# Patient Record
Sex: MALE | Race: BLACK OR AFRICAN AMERICAN | NOT HISPANIC OR LATINO | Employment: UNEMPLOYED | URBAN - METROPOLITAN AREA
[De-identification: names, ages, dates, MRNs, and addresses within clinical notes are randomized per-mention and may not be internally consistent; named-entity substitution may affect disease eponyms.]

---

## 2017-05-25 ENCOUNTER — HOSPITAL ENCOUNTER (EMERGENCY)
Facility: CLINIC | Age: 22
Discharge: HOME | End: 2017-05-26
Attending: EMERGENCY MEDICINE | Admitting: EMERGENCY MEDICINE
Payer: COMMERCIAL

## 2017-05-25 VITALS
OXYGEN SATURATION: 99 % | SYSTOLIC BLOOD PRESSURE: 126 MMHG | HEIGHT: 74 IN | HEART RATE: 80 BPM | RESPIRATION RATE: 18 BRPM | BODY MASS INDEX: 21.17 KG/M2 | TEMPERATURE: 98.3 F | WEIGHT: 165 LBS | DIASTOLIC BLOOD PRESSURE: 84 MMHG

## 2017-05-25 DIAGNOSIS — K59.00 CONSTIPATION, UNSPECIFIED CONSTIPATION TYPE: ICD-10-CM

## 2017-05-25 PROCEDURE — 85027 COMPLETE CBC AUTOMATED: CPT | Performed by: EMERGENCY MEDICINE

## 2017-05-25 PROCEDURE — 80053 COMPREHEN METABOLIC PANEL: CPT | Performed by: EMERGENCY MEDICINE

## 2017-05-25 PROCEDURE — 83690 ASSAY OF LIPASE: CPT | Performed by: EMERGENCY MEDICINE

## 2017-05-25 PROCEDURE — 99285 EMERGENCY DEPT VISIT HI MDM: CPT

## 2017-05-25 NOTE — ED AVS SNAPSHOT
Emergency Department    [address] [facility] [address]    Phone:  [phone]    Fax:  [phone]                                       Kathie Mg   MRN: [id number]    Department:   Emergency Department   Date of Visit:  5/25/2017           Patient Information     Date Of Birth          1995        Your diagnoses for this visit were:     Constipation, unspecified constipation type        You were seen by Claudette Sanders MD.      Follow-up Information     Follow up with Other [facility].    Specialty:  Clinic    Contact information:               Follow up with  Emergency Department.    Specialty:  EMERGENCY MEDICINE    Why:  If symptoms worsen    Contact information:    [address] [facility] [address] [phone]        Discharge Instructions       Recommend starting miralax twice per day, senna and colace twice per day  Can also use magnesium citrate or enemas as well      Discharge Instructions  Constipation  Your doctor has diagnosed you with constipation. Constipation can cause severe cramping pain and your physician thinks this is the cause of your abdominal pain today.  People usually recognize that they are constipated because they have difficulty having bowel movements, are not having bowel movements frequently enough, or are not having large enough bowel movements. Sometimes, especially in children or older people, you do not recognize that you are constipated until it becomes severe. The most common cause of constipation is a combination of lack of exercise and not eating enough fruits, vegetables and whole grains. Constipation can also be a side effect of medications, such as narcotics, or may be caused by a disease of the digestive system.    Return to the Emergency Department if:    Your abdominal pain worsens or does not improve after a bowel movement.    You become very weak.    You get an oral temperature above 102oF or as  directed by your doctor.    You have blood in your stools (bright red or black, tarry stools).    You keep throwing up or can t drink liquids.    Your see blood when you throw up.    Your stomach gets bloated or bigger.    You have new symptoms or anything that worries you.    What can I do to help myself?    If your doctor gave you a cathartic medication, like magnesium citrate or GoLytely  (polyethylene glycol), you can expect to have cramps and gas pains after taking it. You can expect to have a number of bowel movements and even diarrhea in the course of clearing your bowels.  You will know your bowels have been cleaned out after you pass clear liquid. The cramps and gas should let up after you have emptied your bowels. You may want to wait until morning to take this type of medication so you aren t up in the night.     Sometimes instead of cathartics, we recommend laxatives like milk of magnesia to move your bowels more slowly, or an enema to help the bowels to move. Read and follow the package directions, or follow your physician s instructions.    Once you have become very constipated, it takes time for your bowels to return to normal and you need to be very careful to prevent becoming constipated again. Take a laxative if you don t move your bowels at least every two days.       Eat foods that have a lot of fiber. Good choices are fruits, vegetables, prune juice, apple juice and high fiber cereal. Limit dairy products such as milk and cheese, since these can make constipation worse.     Drink plenty of water and other fluids.     When you feel the need to go to the bathroom, go to the bathroom. Don t hold it.    Miralax , Metamucil , Colace , Senna or fiber supplements can be used daily.  Miralax  daily is often the best choice for children.    FOLLOW UP WITH YOUR REGULAR DOCTOR IF YOUR CONSTIPATION IS NOT IMPROVING.  Sometimes, chronic constipation requires further testing to determine the cause. If you are  over 50 years old, you may need a colonoscopy if you have not had one before.   If you were given a prescription for medicine here today, be sure to read all of the information (including the package insert) that comes with your prescription.  This will include important information about the medicine, its side effects, and any warnings that you need to know about.  The pharmacist who fills the prescription can provide more information and answer questions you may have about the medicine.  If you have questions or concerns that the pharmacist cannot address, please call or return to the Emergency Department.     Remember that you can always come back to the Emergency Department if you are not able to see your regular doctor in the amount of time listed above, if you get any new symptoms, or if there is anything that worries you.        24 Hour Appointment Hotline       To make an appointment at any [facility], call [phone] ([phone]). If you don't have a family doctor or clinic, we will help you find one. [facility] clinics are conveniently located to serve the needs of you and your family.             Review of your medicines      Notice     You have not been prescribed any medications.            Procedures and tests performed during your visit     CBC (+ platelets, no diff)    CT Abdomen Pelvis w Contrast    Comprehensive metabolic panel    Lipase      Orders Needing Specimen Collection     None      Pending Results     No orders found for last 3 day(s).            Pending Culture Results     No orders found for last 3 day(s).            Pending Results Instructions     If you had any lab results that were not finalized at the time of your Discharge, you can call the ED Lab Result RN at [phone]. You will be contacted by this team for any positive Lab results or changes in treatment. The nurses are available 7 days a week from 10A to 6:30P.  You can leave a message 24 hours per day and they  will return your call.        Test Results From Your Hospital Stay        5/26/2017 12:27 AM      Component Results     Component Value Ref Range & Units Status    Sodium 139 133 - 144 mmol/L Final    Potassium 3.7 3.4 - 5.3 mmol/L Final    Chloride 105 94 - 109 mmol/L Final    Carbon Dioxide 28 20 - 32 mmol/L Final    Anion Gap 6 3 - 14 mmol/L Final    Glucose 88 70 - 99 mg/dL Final    Urea Nitrogen 8 7 - 30 mg/dL Final    Creatinine 1.11 0.66 - 1.25 mg/dL Final    GFR Estimate 82 >60 mL/min/1.7m2 Final    Non  GFR Calc    GFR Estimate If Black >90   GFR Calc   >60 mL/min/1.7m2 Final    Calcium 9.2 8.5 - 10.1 mg/dL Final    Bilirubin Total 0.6 0.2 - 1.3 mg/dL Final    Albumin 4.1 3.4 - 5.0 g/dL Final    Protein Total 7.7 6.8 - 8.8 g/dL Final    Alkaline Phosphatase 94 40 - 150 U/L Final    ALT 19 0 - 70 U/L Final    AST 16 0 - 45 U/L Final         5/26/2017 12:08 AM      Component Results     Component Value Ref Range & Units Status    WBC 8.5 4.0 - 11.0 10e9/L Final    RBC Count 5.92 (H) 4.4 - 5.9 10e12/L Final    Hemoglobin 14.4 13.3 - 17.7 g/dL Final    Hematocrit 44.8 40.0 - 53.0 % Final    MCV 76 (L) 78 - 100 fl Final    MCH 24.3 (L) 26.5 - 33.0 pg Final    MCHC 32.1 31.5 - 36.5 g/dL Final    RDW 13.4 10.0 - 15.0 % Final    Platelet Count 172 150 - 450 10e9/L Final         5/26/2017 12:25 AM      Component Results     Component Value Ref Range & Units Status    Lipase 73 73 - 393 U/L Final         5/26/2017  3:40 AM      Narrative     CT ABDOMEN AND PELVIS WITH CONTRAST  5/26/2017 1:42 AM     HISTORY: Abdominal pain. Rectal pain. Abnormal bowel movements.    COMPARISON: None.    TECHNIQUE: Following the uneventful administration of 81 mL Isovue-370  intravenous contrast, helical sections were acquired from the top of  the diaphragm through the pubic symphysis. Coronal reconstructions  were generated. Radiation dose for this scan was reduced using  automated exposure control,  adjustment of the mA and/or kV according  to the patient's size, or iterative reconstruction technique.    FINDINGS:  Abdomen: The liver, spleen, pancreas, adrenal glands and kidneys are  unremarkable. The gallbladder is present. No enlarged lymph nodes or  free fluid in the upper abdomen.    Scan through the lower chest is unremarkable.    Pelvis: A moderate amount of stool in the left hemicolon and  rectosigmoid colon. The rectum is moderately distended with stool. The  small and large bowel are otherwise normal in caliber. The appendix is  not definitely visualized. No bowel wall thickening, pneumatosis or  free intraperitoneal gas. Fluid is present within the lumens of the  small and large bowel. No perirectal fluid collection. No enlarged  lymph nodes or free fluid in the pelvis.        Impression     IMPRESSION:  1. A moderate amount of stool is present in the left hemicolon and  rectosigmoid colon and the rectum is moderately distended with stool.  2. Fluid is present in the lumens of the small and large bowel. This  is nonspecific, but could relate to gastroenteritis.  3. No other cause of acute pain identified in the abdomen or pelvis.    DAMION REBOLLEDO MD                Clinical Quality Measure: Blood Pressure Screening     Your blood pressure was checked while you were in the emergency department today. The last reading we obtained was  BP: 126/84 . Please read the guidelines below about what these numbers mean and what you should do about them.  If your systolic blood pressure (the top number) is less than 120 and your diastolic blood pressure (the bottom number) is less than 80, then your blood pressure is normal. There is nothing more that you need to do about it.  If your systolic blood pressure (the top number) is 120-139 or your diastolic blood pressure (the bottom number) is 80-89, your blood pressure may be higher than it should be. You should have your blood pressure rechecked within a year by a  "primary care provider.  If your systolic blood pressure (the top number) is 140 or greater or your diastolic blood pressure (the bottom number) is 90 or greater, you may have high blood pressure. High blood pressure is treatable, but if left untreated over time it can put you at risk for heart attack, stroke, or kidney failure. You should have your blood pressure rechecked by a primary care provider within the next 4 weeks.  If your provider in the emergency department today gave you specific instructions to follow-up with your doctor or provider even sooner than that, you should follow that instruction and not wait for up to 4 weeks for your follow-up visit.        Thank you for choosing [facility]       Thank you for choosing [facility] for your care. Our goal is always to provide you with excellent care. Hearing back from our patients is one way we can continue to improve our services. Please take a few minutes to complete the written survey that you may receive in the mail after you visit with us. Thank you!        [employer] Information     [facility] lets you send messages to your doctor, view your test results, renew your prescriptions, schedule appointments and more. To sign up, go to [email or web address] . Click on \"Log in\" on the left side of the screen, which will take you to the Welcome page. Then click on \"Sign up Now\" on the right side of the page.     You will be asked to enter the access code listed below, as well as some personal information. Please follow the directions to create your username and password.     Your access code is: [email or web address]  Expires: 2017  4:13 AM     Your access code will  in 90 days. If you need help or a new code, please call your [facility] clinic or [phone].        Care EveryWhere ID     This is your Care EveryWhere ID. This could be used by other organizations to access your [facility] medical records  [id number]        After Visit Summary       This is your " record. Keep this with you and show to your community pharmacist(s) and doctor(s) at your next visit.

## 2017-05-25 NOTE — ED AVS SNAPSHOT
Emergency Department    [address] [place] [address]    Phone:  [phone]    Fax:  [phone]                                       Kathie Mg   MRN: [id number]    Department:   Emergency Department   Date of Visit:  5/25/2017           After Visit Summary Signature Page     I have received my discharge instructions, and my questions have been answered. I have discussed any challenges I see with this plan with the nurse or doctor.    ..........................................................................................................................................  Patient/Patient Representative Signature      ..........................................................................................................................................  Patient Representative Print Name and Relationship to Patient    ..................................................               ................................................  Date                                            Time    ..........................................................................................................................................  Reviewed by Signature/Title    ...................................................              ..............................................  Date                                                            Time

## 2017-05-26 ENCOUNTER — APPOINTMENT (OUTPATIENT)
Dept: CT IMAGING | Facility: CLINIC | Age: 22
End: 2017-05-26
Attending: EMERGENCY MEDICINE
Payer: COMMERCIAL

## 2017-05-26 LAB
ALBUMIN SERPL-MCNC: 4.1 G/DL (ref 3.4–5)
ALP SERPL-CCNC: 94 U/L (ref 40–150)
ALT SERPL W P-5'-P-CCNC: 19 U/L (ref 0–70)
ANION GAP SERPL CALCULATED.3IONS-SCNC: 6 MMOL/L (ref 3–14)
AST SERPL W P-5'-P-CCNC: 16 U/L (ref 0–45)
BILIRUB SERPL-MCNC: 0.6 MG/DL (ref 0.2–1.3)
BUN SERPL-MCNC: 8 MG/DL (ref 7–30)
CALCIUM SERPL-MCNC: 9.2 MG/DL (ref 8.5–10.1)
CHLORIDE SERPL-SCNC: 105 MMOL/L (ref 94–109)
CO2 SERPL-SCNC: 28 MMOL/L (ref 20–32)
CREAT SERPL-MCNC: 1.11 MG/DL (ref 0.66–1.25)
ERYTHROCYTE [DISTWIDTH] IN BLOOD BY AUTOMATED COUNT: 13.4 % (ref 10–15)
GFR SERPL CREATININE-BSD FRML MDRD: 82 ML/MIN/1.7M2
GLUCOSE SERPL-MCNC: 88 MG/DL (ref 70–99)
HCT VFR BLD AUTO: 44.8 % (ref 40–53)
HGB BLD-MCNC: 14.4 G/DL (ref 13.3–17.7)
LIPASE SERPL-CCNC: 73 U/L (ref 73–393)
MCH RBC QN AUTO: 24.3 PG (ref 26.5–33)
MCHC RBC AUTO-ENTMCNC: 32.1 G/DL (ref 31.5–36.5)
MCV RBC AUTO: 76 FL (ref 78–100)
PLATELET # BLD AUTO: 172 10E9/L (ref 150–450)
POTASSIUM SERPL-SCNC: 3.7 MMOL/L (ref 3.4–5.3)
PROT SERPL-MCNC: 7.7 G/DL (ref 6.8–8.8)
RBC # BLD AUTO: 5.92 10E12/L (ref 4.4–5.9)
SODIUM SERPL-SCNC: 139 MMOL/L (ref 133–144)
WBC # BLD AUTO: 8.5 10E9/L (ref 4–11)

## 2017-05-26 PROCEDURE — 74177 CT ABD & PELVIS W/CONTRAST: CPT

## 2017-05-26 PROCEDURE — 25000132 ZZH RX MED GY IP 250 OP 250 PS 637: Performed by: EMERGENCY MEDICINE

## 2017-05-26 PROCEDURE — 25000128 H RX IP 250 OP 636: Performed by: EMERGENCY MEDICINE

## 2017-05-26 PROCEDURE — 25000125 ZZHC RX 250: Performed by: EMERGENCY MEDICINE

## 2017-05-26 RX ORDER — IOPAMIDOL 755 MG/ML
81 INJECTION, SOLUTION INTRAVASCULAR ONCE
Status: COMPLETED | OUTPATIENT
Start: 2017-05-26 | End: 2017-05-26

## 2017-05-26 RX ADMIN — IOPAMIDOL 81 ML: 755 INJECTION, SOLUTION INTRAVENOUS at 01:43

## 2017-05-26 RX ADMIN — SODIUM CHLORIDE 65 ML: 9 INJECTION, SOLUTION INTRAVENOUS at 01:44

## 2017-05-26 RX ADMIN — DOCUSATE SODIUM 286 ML: 10 LIQUID ORAL at 02:55

## 2017-05-26 ASSESSMENT — ENCOUNTER SYMPTOMS
ANAL BLEEDING: 1
VOMITING: 0
RECTAL PAIN: 1
DYSURIA: 0
DIFFICULTY URINATING: 1
CONSTIPATION: 1
ABDOMINAL PAIN: 1
FEVER: 0
HEMATURIA: 0
NAUSEA: 0

## 2017-05-26 NOTE — DISCHARGE INSTRUCTIONS
Recommend starting miralax twice per day, senna and colace twice per day  Can also use magnesium citrate or enemas as well      Discharge Instructions  Constipation  Your doctor has diagnosed you with constipation. Constipation can cause severe cramping pain and your physician thinks this is the cause of your abdominal pain today.  People usually recognize that they are constipated because they have difficulty having bowel movements, are not having bowel movements frequently enough, or are not having large enough bowel movements. Sometimes, especially in children or older people, you do not recognize that you are constipated until it becomes severe. The most common cause of constipation is a combination of lack of exercise and not eating enough fruits, vegetables and whole grains. Constipation can also be a side effect of medications, such as narcotics, or may be caused by a disease of the digestive system.    Return to the Emergency Department if:    Your abdominal pain worsens or does not improve after a bowel movement.    You become very weak.    You get an oral temperature above 102oF or as directed by your doctor.    You have blood in your stools (bright red or black, tarry stools).    You keep throwing up or can t drink liquids.    Your see blood when you throw up.    Your stomach gets bloated or bigger.    You have new symptoms or anything that worries you.    What can I do to help myself?    If your doctor gave you a cathartic medication, like magnesium citrate or GoLytely  (polyethylene glycol), you can expect to have cramps and gas pains after taking it. You can expect to have a number of bowel movements and even diarrhea in the course of clearing your bowels.  You will know your bowels have been cleaned out after you pass clear liquid. The cramps and gas should let up after you have emptied your bowels. You may want to wait until morning to take this type of medication so you aren t up in the night.      Sometimes instead of cathartics, we recommend laxatives like milk of magnesia to move your bowels more slowly, or an enema to help the bowels to move. Read and follow the package directions, or follow your physician s instructions.    Once you have become very constipated, it takes time for your bowels to return to normal and you need to be very careful to prevent becoming constipated again. Take a laxative if you don t move your bowels at least every two days.       Eat foods that have a lot of fiber. Good choices are fruits, vegetables, prune juice, apple juice and high fiber cereal. Limit dairy products such as milk and cheese, since these can make constipation worse.     Drink plenty of water and other fluids.     When you feel the need to go to the bathroom, go to the bathroom. Don t hold it.    Miralax , Metamucil , Colace , Senna or fiber supplements can be used daily.  Miralax  daily is often the best choice for children.    FOLLOW UP WITH YOUR REGULAR DOCTOR IF YOUR CONSTIPATION IS NOT IMPROVING.  Sometimes, chronic constipation requires further testing to determine the cause. If you are over 50 years old, you may need a colonoscopy if you have not had one before.   If you were given a prescription for medicine here today, be sure to read all of the information (including the package insert) that comes with your prescription.  This will include important information about the medicine, its side effects, and any warnings that you need to know about.  The pharmacist who fills the prescription can provide more information and answer questions you may have about the medicine.  If you have questions or concerns that the pharmacist cannot address, please call or return to the Emergency Department.     Remember that you can always come back to the Emergency Department if you are not able to see your regular doctor in the amount of time listed above, if you get any new symptoms, or if there is anything that  worries you.

## 2017-05-26 NOTE — ED NOTES
Bed: ED01  Expected date: 5/25/17  Expected time: 11:17 PM  Means of arrival: Ambulance  Comments:  531 22m abd pain

## 2017-05-26 NOTE — ED PROVIDER NOTES
"  History     Chief Complaint:  Abdominal pain, Constipation, Rectal pain     HPI   Kathie Mg is a 22 year old male who presents via EMS for evaluation of abdominal pain, constipation, and rectal pain which began a few days ago.  The patient reports his last good bowel movement was about seven days ago.  He states a few days ago he began to experience diffuse abdominal pain which he describes as a sharp stabbing \"gas-like\" pain.  He reports he then developed rectal pain and pressure stating he has had the urge to make a bowel movement but has been unable to.  The patient states he had a small bowel movement yesterday but relays it was hard and afterwards he continued to have pain and the urge to make a movement.  Additionally, he  reports there were drops of red blood on the toilet and on the toilet paper.  He reports urinating increases the rectal pain and he feels he is unable to completely void because of pain in bottom.  He denies any pain or burning with urination or hematuria.  The patient states he has been trying magnesium water and mineral oils without improvement of his symptoms. The patient denies similar symptoms previously, denies personal or family history of Crohn's or ulcerative colitis, and denies prior abdominal surgeries.  He denies fever, chills, nausea, and vomiting.      Allergies:  NKDA     Medications:    The patient is currently on no regular medications.       Past Medical History:    ADHD  Anxiety  Depression  Insomnia     Past Surgical History:    History reviewed.  No significant past surgical history.     Family History:  History reviewed.  No significant family history.     Social History:  Relationship status: Single  The patient is a current smoker. 0.25 pack/day  The patient reports alcohol use.   The patient presents alone via EMS.    Review of Systems   Constitutional: Negative for fever.   Gastrointestinal: Positive for abdominal pain, anal bleeding, constipation and " "rectal pain. Negative for nausea and vomiting.   Genitourinary: Positive for difficulty urinating. Negative for dysuria and hematuria.   All other systems reviewed and are negative.      Physical Exam   First Vitals:  BP: 126/84  Pulse: 80  Temp: 98.3  F (36.8  C)  Resp: 18  Height: 188 cm (6' 2\")  Weight: 74.8 kg (165 lb)  SpO2: 99 %      Physical Exam  General: Resting comfortably on the gurney  Eyes:  The pupils are equal and round  ENT:    Moist mucous membranes  Neck:  Normal range of motion  CV:  Regular rate and rhythm    Skin warm and well perfused   Resp:  Lungs are clear    Non-labored    No rales    No wheezing   GI:  Abdomen is soft, there is no rigidity.  Mild diffuse abdominal tenderness.      No distension    No rebound tenderness    No guarding  REctal: Non bloody, mild pain on digital exam, no hemorrhoids or mass palpated. Male RN in room  MS:  Normal muscular tone  Skin:  No rash or acute skin lesions noted  Neuro:   Awake, alert.      Speech is normal and fluent.    Face is symmetric.     Moves all extremities  Psych:  Normal affect.  Appropriate interactions.      Emergency Department Course     Imaging:  Radiographic findings were communicated with the patient who voiced understanding of the findings.    CT Abdomen and Pelvis, with contrast, as per radiology:   IMPRESSION:  1. A moderate amount of stool is present in the left hemicolon and  rectosigmoid colon and the rectum is moderately distended with stool.  2. Fluid is present in the lumens of the small and large bowel. This  is nonspecific, but could relate to gastroenteritis.  3. No other cause of acute pain identified in the abdomen or pelvis.    Laboratory:  CBC: WBC 8.5 (WNL) HGB 14.4 (WNL)  (WNL)   CMP: Cr 1.11 (WNL) Glucose 88 (WNL) Rest WNL  Lipase: 73 (WNL)    Interventions:  0255 Pink Lady Enema, 286 mL, rectal     ED Course:  The patient arrived by ambulance. He was escorted to the ED where his vitals were measured and " recorded.   Nursing notes and past medical history reviewed.   I performed a physical examination of the patient as documented above.  I explained the plan with the patient who consents to this.   The patient underwent the workup as described above.   I performed a rectal examination in the presence of a male chaperone.   Patient reports good results and relief of pain following enema.   I personally reviewed the laboratory and imaging results with the Patient and answered all related questions prior to discharge.   Findings and plan explained to the Patient. Patient discharged home with instructions regarding supportive care, medications, and reasons to return. The importance of close follow-up was reviewed.     Impression & Plan      Medical Decision Making:  Kathie Mg is a 22 year old male who presents to the emergency department today with constipation.  He is also complaining of rectal pain.  He is also having a little bit of bleeding when he wipes.  His vital signs and hemoglobin are normal.  He is having mild diffuse abdominal tenderness.  I obtained CT scan which shows a moderate amount of stool and there is also some fluid in the small and large bowel which could be nonspecific but could also be related to gastroenteritis.  I feel that gastroenteritis is unlikely.  Given the constipation he wanted to try an enema and so this was done. Had good bowel movement in ED and felt much better.  I recommended a stool regimen at home and follow up if not improving. Hemoglobin stable, vital signs stable, no indication for hospitalization and colonoscopy emergently. Blood will likely go away now that bowel movements are back to normal. Could be from small fissure based on his history of constipation.  Reasons to return to the ED were discussed with the patient.      Diagnosis:    ICD-10-CM   1. Constipation, unspecified constipation type K59.00         Disposition:   Discharge to home with primary care follow  up.       I, Raymond Frey, am serving as a scribe on 5/25/2017 at 11:45 PM to personally document services performed by Claudette Sanders MD, based on my observations and the provider's statements to me.       Claudette Sanders MD  05/26/17 1119

## 2023-10-19 ENCOUNTER — HOSPITAL ENCOUNTER (EMERGENCY)
Facility: CLINIC | Age: 28
Discharge: HOME | End: 2023-10-19
Attending: EMERGENCY MEDICINE | Admitting: EMERGENCY MEDICINE
Payer: COMMERCIAL

## 2023-10-19 VITALS
SYSTOLIC BLOOD PRESSURE: 143 MMHG | TEMPERATURE: 98.9 F | HEART RATE: 102 BPM | DIASTOLIC BLOOD PRESSURE: 90 MMHG | OXYGEN SATURATION: 100 % | RESPIRATION RATE: 16 BRPM

## 2023-10-19 DIAGNOSIS — K08.89 PAIN, DENTAL: ICD-10-CM

## 2023-10-19 PROCEDURE — 99283 EMERGENCY DEPT VISIT LOW MDM: CPT

## 2023-10-19 PROCEDURE — 250N000013 HC RX MED GY IP 250 OP 250 PS 637: Performed by: EMERGENCY MEDICINE

## 2023-10-19 RX ORDER — OXYCODONE HYDROCHLORIDE 5 MG/1
10 TABLET ORAL ONCE
Status: COMPLETED | OUTPATIENT
Start: 2023-10-19 | End: 2023-10-19

## 2023-10-19 RX ORDER — ACETAMINOPHEN 500 MG
1000 TABLET ORAL ONCE
Status: COMPLETED | OUTPATIENT
Start: 2023-10-19 | End: 2023-10-19

## 2023-10-19 RX ADMIN — OXYCODONE HYDROCHLORIDE 10 MG: 5 TABLET ORAL at 17:02

## 2023-10-19 RX ADMIN — ACETAMINOPHEN 1000 MG: 500 TABLET, FILM COATED ORAL at 17:02

## 2023-10-19 ASSESSMENT — ACTIVITIES OF DAILY LIVING (ADL): ADLS_ACUITY_SCORE: 35

## 2023-10-19 NOTE — ED PROVIDER NOTES
History     Chief Complaint:  Dental Pain       HPI   Kathie Mg is a 28 year old male presenting with right lower tooth pain.  He had a chipped tooth that started at 1 year ago.  The pain significantly worsened recently.  He has not been able to get into see a dentist as of yet.  He has been taking Advil for pain, with no relief.  No fever, chills.      Independent Historian:    Patient only    Review of External Notes:  NA      Medications:    No current outpatient medications on file.      Past Medical History:    Past Medical History:   Diagnosis Date    ADHD (attention deficit hyperactivity disorder)     Anxiety     Depressive disorder     Insomnia        Past Surgical History:    No past surgical history on file.       Physical Exam   Patient Vitals for the past 24 hrs:   BP Temp Temp src Pulse Resp SpO2   10/19/23 1654 -- -- -- -- 16 --   10/19/23 1652 (!) 143/90 98.9  F (37.2  C) Temporal 102 -- 100 %        Physical Exam  General: No acute distress.  Eyes:  Conjunctivae clear.   HENT: Dental andrae of tooth 31, tooth is not loose and is nontender to palpation.  No surrounding edema or signs of abscess.  Tolerating secretions.  No anterior neck or facial swelling.  Neck: Normal range of motion.   CV: Skin is well perfused, no cyanosis  Respiratory: Effort normal. No audible wheezing.  Gastrointestinal: Non-distended.  Musculoskeletal: Normal range of motion. No gross deformities.  Neuro: Alert. Moving all extremities appropriately.  Normal speech.    Skin: No rashes or lesions on exposed skin.      Emergency Department Course     Laboratory:  Labs Ordered and Resulted from Time of ED Arrival to Time of ED Departure - No data to display     Procedures   NA    Emergency Department Course & Assessments:             Interventions:  Medications   acetaminophen (TYLENOL) tablet 1,000 mg (1,000 mg Oral $Given 10/19/23 1702)   oxyCODONE (ROXICODONE) tablet 10 mg (10 mg Oral $Given 10/19/23 1702)         Assessments:  1658 -initial assessment and evaluation of patient    Independent Interpretation (X-rays, CTs, rhythm strip):  None    Consultations/Discussion of Management or Tests:  None       Social Determinants of Health affecting care:  None     Disposition:  The patient was discharged to home.     Impression & Plan    CMS Diagnoses: None      Medical Decision Making:  Patient appears uncomfortable, but is hemodynamically stable.  He is tolerating his secretions and protecting his airway.  On exam he has a dental andrae with no concern for abscess.  I do not believe he needs any imaging.  He would benefit from a dentist evaluation.  It is made clear to him that we do not pull teeth or fill cavities in the emergency department.  His significant other is at bedside, and she is calling the dentist.  I offered him a dental block or a pill for pain.  He elected to take a pill.  He is given Tylenol and oxycodone.  He is medically cleared for discharge.  They plan to go to the dentist after leaving the emergency department.  Return precautions are given and he verbalizes understanding.  He is discharged in stable condition with his significant other.        Diagnosis:    ICD-10-CM    1. Pain, dental  K08.89            Discharge Medications:  There are no discharge medications for this patient.         Cyrus Vaca MD  10/19/2023   Cyrus Vaca MD Peery, Stephen, MD  10/19/23 7431

## 2023-10-19 NOTE — ED TRIAGE NOTES
Right lower molar pain that's worsening for several days.        
This patient has been assessed with a concern for Malnutrition and was treated during this hospitalization for the following Nutrition diagnosis/diagnoses:     -  01/02/2023: Moderate protein-calorie malnutrition

## 2025-03-23 ENCOUNTER — HOSPITAL ENCOUNTER (OUTPATIENT)
Facility: CLINIC | Age: 30
Setting detail: OBSERVATION
Discharge: HOME | End: 2025-03-24
Attending: EMERGENCY MEDICINE | Admitting: EMERGENCY MEDICINE

## 2025-03-23 DIAGNOSIS — F23 ACUTE PSYCHOSIS (H): ICD-10-CM

## 2025-03-23 PROCEDURE — 99285 EMERGENCY DEPT VISIT HI MDM: CPT

## 2025-03-23 PROCEDURE — 250N000013 HC RX MED GY IP 250 OP 250 PS 637: Performed by: EMERGENCY MEDICINE

## 2025-03-23 RX ORDER — OLANZAPINE 10 MG/1
10 TABLET, ORALLY DISINTEGRATING ORAL ONCE
Status: COMPLETED | OUTPATIENT
Start: 2025-03-23 | End: 2025-03-23

## 2025-03-23 RX ADMIN — OLANZAPINE 10 MG: 10 TABLET, ORALLY DISINTEGRATING ORAL at 23:57

## 2025-03-23 ASSESSMENT — ACTIVITIES OF DAILY LIVING (ADL): ADLS_ACUITY_SCORE: 41

## 2025-03-24 VITALS
HEIGHT: 74 IN | HEART RATE: 79 BPM | DIASTOLIC BLOOD PRESSURE: 89 MMHG | SYSTOLIC BLOOD PRESSURE: 130 MMHG | RESPIRATION RATE: 20 BRPM | BODY MASS INDEX: 20.34 KG/M2 | WEIGHT: 158.5 LBS | TEMPERATURE: 98.4 F | OXYGEN SATURATION: 100 %

## 2025-03-24 PROBLEM — R44.3 HALLUCINATIONS: Status: ACTIVE | Noted: 2025-03-24

## 2025-03-24 PROBLEM — F23 ACUTE PSYCHOSIS (H): Status: ACTIVE | Noted: 2025-03-24

## 2025-03-24 LAB
AMPHETAMINES UR QL SCN: ABNORMAL
BARBITURATES UR QL SCN: ABNORMAL
BENZODIAZ UR QL SCN: ABNORMAL
BZE UR QL SCN: ABNORMAL
CANNABINOIDS UR QL SCN: ABNORMAL
FENTANYL UR QL: ABNORMAL
OPIATES UR QL SCN: ABNORMAL
PCP QUAL URINE (ROCHE): ABNORMAL

## 2025-03-24 PROCEDURE — 250N000013 HC RX MED GY IP 250 OP 250 PS 637: Performed by: NURSE PRACTITIONER

## 2025-03-24 PROCEDURE — 250N000013 HC RX MED GY IP 250 OP 250 PS 637: Performed by: EMERGENCY MEDICINE

## 2025-03-24 PROCEDURE — 80307 DRUG TEST PRSMV CHEM ANLYZR: CPT | Performed by: EMERGENCY MEDICINE

## 2025-03-24 RX ORDER — OLANZAPINE 5 MG/1
10 TABLET ORAL 2 TIMES DAILY PRN
Qty: 60 TABLET | Refills: 0 | Status: SHIPPED | OUTPATIENT
Start: 2025-03-24 | End: 2025-03-24

## 2025-03-24 RX ORDER — OLANZAPINE 10 MG/1
10 TABLET, ORALLY DISINTEGRATING ORAL 2 TIMES DAILY PRN
Status: DISCONTINUED | OUTPATIENT
Start: 2025-03-24 | End: 2025-03-24 | Stop reason: HOSPADM

## 2025-03-24 RX ORDER — LORAZEPAM 2 MG/1
2 TABLET ORAL ONCE
Status: COMPLETED | OUTPATIENT
Start: 2025-03-24 | End: 2025-03-24

## 2025-03-24 RX ORDER — OLANZAPINE 10 MG/1
10 TABLET, ORALLY DISINTEGRATING ORAL ONCE
Status: COMPLETED | OUTPATIENT
Start: 2025-03-24 | End: 2025-03-24

## 2025-03-24 RX ORDER — OLANZAPINE 5 MG/1
TABLET ORAL
Qty: 60 TABLET | Refills: 0 | Status: SHIPPED | OUTPATIENT
Start: 2025-03-24

## 2025-03-24 RX ADMIN — LORAZEPAM 2 MG: 2 TABLET ORAL at 01:16

## 2025-03-24 RX ADMIN — OLANZAPINE 10 MG: 10 TABLET, ORALLY DISINTEGRATING ORAL at 17:52

## 2025-03-24 ASSESSMENT — ACTIVITIES OF DAILY LIVING (ADL)
ADLS_ACUITY_SCORE: 41

## 2025-03-24 ASSESSMENT — COLUMBIA-SUICIDE SEVERITY RATING SCALE - C-SSRS
6. HAVE YOU EVER DONE ANYTHING, STARTED TO DO ANYTHING, OR PREPARED TO DO ANYTHING TO END YOUR LIFE?: NO
REASONS FOR IDEATION SINCE LAST CONTACT: MOSTLY TO END OR STOP THE PAIN (YOU COULDN'T GO ON LIVING WITH THE PAIN OR HOW YOU WERE FEELING)
1. SINCE LAST CONTACT, HAVE YOU WISHED YOU WERE DEAD OR WISHED YOU COULD GO TO SLEEP AND NOT WAKE UP?: YES
TOTAL  NUMBER OF ABORTED OR SELF INTERRUPTED ATTEMPTS SINCE LAST CONTACT: NO
TOTAL  NUMBER OF INTERRUPTED ATTEMPTS SINCE LAST CONTACT: NO
ATTEMPT SINCE LAST CONTACT: NO
SUICIDE, SINCE LAST CONTACT: NO
2. HAVE YOU ACTUALLY HAD ANY THOUGHTS OF KILLING YOURSELF?: NO

## 2025-03-24 NOTE — ED NOTES
3/23/2025  Kathie Mg 1995     Writer consulted with ED on this date at  4:40 AM. It was determined that pt would not benefit from assessment at this time due to Pt unable able to participate in assessment.    ED will call DEC at [phone] when pt is ready and able to participate in assessment.     Jerrod Kearns

## 2025-03-24 NOTE — ED NOTES
"Patient is a 31 yo old male received from ED to [facility]. Patient was admitted due to concerns about PTSD symptoms and anxiety. Patient stated to feeling \"tired\". Patient claims to hear voices. Patient claims to rarely drinks. Patient claimed to take marijuana and Vape few hours ago. Patient currently denies SI/HI. Patient would like to get mental health support and medication management. Nursing and risk assessments completed. Assessments reviewed with [facility] and physician. Video monitoring in progress, patient informed. Admission information reviewed with patient. [facility] and instructions on using the facility. Questions regarding [facility] addressed. Pt search completed and belongings inventoried.  "

## 2025-03-24 NOTE — ED NOTES
[facility]  ED to EMPATH Checklist:      Goal for EMPATH: Delusions    Current Behavior: Quiet, Calm, and Cooperative    Safety Concerns: Visual Hallucinations    Legal Hold Status: Voluntary    Medically Cleared by ED provider: Yes    Patient Therapeutically Searched: N/A    Belongings: Remain with patient    Independent Ambulation at Baseline: Yes/No: Yes    Participates in Care/Conversation: Yes/No: Yes    Patient Informed about EMPATH: Yes/No: Yes    DEC: Ordered and completed    Patient Ready to be Transferred to EMPATH? Yes/No: Yes

## 2025-03-24 NOTE — ED PROVIDER NOTES
"  Emergency Department Note      History of Present Illness     Chief Complaint   Anxiety      HPI   Kathie Mg is a 30 year old male with history of anxiety and depression who presents to the emergency department with concerns of anxiety and mental health.  Patient is soft-spoken and somewhat difficult to understand.  It sounds as though he called EMS for concerns about \"PTSD\" and requested to come to the hospital.  Here the patient is pacing in the room.  He reports that he feels like there are skin all around that are going to eat him.  He denies any thoughts of self-harm, but also states that he feels like sometimes he is a skinwalker.  he denies thoughts of self-harm or harm to others.  He denies feelings of paranoia.  He notes that since he has been young child he has been able to hear things from very far away.  He denies any drug use.    Independent Historian   None    Review of External Notes   None    Past Medical History     Medical History and Problem List   Past Medical History:   Diagnosis Date    ADHD (attention deficit hyperactivity disorder)     Anxiety     Depressive disorder     Insomnia        Medications   No current outpatient medications on file.      Surgical History   No past surgical history on file.    Physical Exam     Patient Vitals for the past 24 hrs:   BP Temp Temp src Pulse Resp SpO2 Height Weight   03/23/25 2219 -- 98.3  F (36.8  C) Oral -- -- -- 1.88 m (6' 2\") 73.4 kg (161 lb 12.8 oz)   03/23/25 2218 (!) 149/101 -- -- 96 16 100 % -- --     Physical Exam  Eyes:  The pupils are equal and round    Conjunctivae and sclerae are normal  ENT:    The nose is normal    Pinnae are normal  Resp:  Normal respiratory effort    Speaks in full sentences  MS:  Normal muscular tone    No asymmetric leg swelling  Skin:  No rash or acute skin lesions noted  Neuro:   Awake, alert.      Speech is normal and fluent.    Face is symmetric.     Moves all extremities  Psych: Odd affect. Endorses " "others being \"Skinwalkers.\" Pacing in room. Frequently looks toward the nurses station.    Diagnostics     Lab Results   Labs Ordered and Resulted from Time of ED Arrival to Time of ED Departure   URINE DRUG SCREEN PANEL - Abnormal       Result Value    Amphetamines Urine Screen Negative      Barbituates Urine Screen Negative      Benzodiazepine Urine Screen Negative      Cannabinoids Urine Screen Positive (*)     Cocaine Urine Screen Negative      Fentanyl Qual Urine Screen Negative      Opiates Urine Screen Negative      PCP Urine Screen Negative         Imaging   No orders to display         Independent Interpretation   None    ED Course      Medications Administered   Medications   OLANZapine zydis (zyPREXA) ODT tab 10 mg (10 mg Oral $Given 3/23/25 6201)   LORazepam (ATIVAN) tablet 2 mg (2 mg Oral $Given 3/24/25 4293)       Procedures   Procedures     Discussion of Management   ED Mental Health, Pending    ED Course        Additional Documentation  None    Medical Decision Making / Diagnosis     CMS Diagnoses: None    MIPS       None    MDM   Kathei Mg is a 30 year old male who presents to the emergency department brought in by EMS after requesting to be brought here for concerns about PTSD.  Patient upon chart review has a history of anxiety and depression.  Patient denies any drug use.  He appears to be quite restless and paces around the room.  He is concerned that other people are \"skinwalkers.\"  He also thinks that at times he is also a \"skinwalker.\"  He denies any thoughts of self-harm or harm to others.  He does appear quite restless and so was offered and took olanzapine.  He was also given lorazepam later as he continued to walk out of his room and walk around the nursing station.  Recommended a mental health evaluation and he was agreeable to that because he feels like he is not thinking right.  He denies any drug use.  Urine drug screen shows cannabinoids.  Patient signed out to my partner " pending mental health evaluation.    Disposition   Care of the patient was transferred to my colleague Dr. Patel pending DEC evaluation.     Diagnosis     ICD-10-CM    1. Acute psychosis (H)  F23            MD Sara Hsu Aaron Joseph, MD  03/24/25 0124

## 2025-03-24 NOTE — ED NOTES
Pt was walking out into triage. Pt went back to his room and is asleep. DEC still unable to assess pt as he continues to not answer questions.

## 2025-03-24 NOTE — ED TRIAGE NOTES
"Pt BIBA from [facility]. Pt told EMS that he needed to be seen for PTSD. Pt reports he is \"kind of out of it\" and is having \"PTSD outbursts\". Pt does not provide much information in triage.      Triage Assessment (Adult)       Row Name 03/23/25 3834          Triage Assessment    Airway WDL WDL        Respiratory WDL    Respiratory WDL WDL        Cardiac WDL    Cardiac WDL WDL        Peripheral/Neurovascular WDL    Peripheral Neurovascular WDL WDL        Cognitive/Neuro/Behavioral WDL    Cognitive/Neuro/Behavioral WDL WDL                     "

## 2025-03-24 NOTE — ED PROVIDER NOTES
"[facility] Unit - Psychiatry  Combined Observation Note and Discharge Summary  [facility] Emergency Department  Observation Initiation Date: Mar 23, 2025    Kathie Mg MRN: [id number]   Age: 30 year old YOB: 1995     Telemedicine Visit: The patient's condition can be safely assessed and treated via synchronous audio and visual telemedicine encounter.      Reason for Telemedicine Visit: Services only offered telehealth      Originating Site (Patient Location): [facility] emergency department unit    Distant Site (Provider Location): Provider Remote Home Office Setting    Consent:  The patient/guardian has verbally consented to: the potential risks and benefits of telemedicine (video visit or phone) versus in person care; bill my insurance or make self-payment for services provided; and responsibility for payment of non-covered services.     Mode of Communication: [employer], a secure HIPAA compliant video platform      Start time:  1730  End time:   1745   History     Chief Complaint   Patient presents with    Anxiety     HPI  Kathie Mg is a 30 year old male with a past history notable for    20  {Past History:560574}      Review of Systems  {Complete vs limited ROS:575468}    Physical Examination      BP (!) 141/86   Pulse 64   Temp 98.3  F (36.8  C) (Oral)   Resp 14   Ht 1.88 m (6' 2\")   Wt 73.4 kg (161 lb 12.8 oz)   SpO2 100%   BMI 20.77 kg/m     Physical Exam  General: Appears stated age.   Neuro: Alert and fully oriented. Extremities appear to demonstrate normal strength on visual inspection.   Integumentary/Skin: no rash visualized, normal color    Psychiatric Examination   Appearance: { :953575}  Attitude:  { :971346}  Eye Contact:  { :705744}  Mood:  { :939150}  Affect:  { :984885}  Speech:  { :021279}  Psychomotor Behavior:  { :310176}  Thought Process:  { :967164}  Associations:  { :804555}  Thought Content:  { :143507}  Insight:  { :811956}  Judgement:  { " ":268598}  Oriented to:  { :479958}  Attention Span and Concentration:  { :012317}  Recent and Remote Memory:  { :610147}  Language: able to name/identify objects without impairment  Fund of Knowledge: intact with awareness of current and past events    ED Course        Labs Ordered and Resulted from Time of ED Arrival to Time of ED Departure   URINE DRUG SCREEN PANEL - Abnormal       Result Value    Amphetamines Urine Screen Negative      Barbituates Urine Screen Negative      Benzodiazepine Urine Screen Negative      Cannabinoids Urine Screen Positive (*)     Cocaine Urine Screen Negative      Fentanyl Qual Urine Screen Negative      Opiates Urine Screen Negative      PCP Urine Screen Negative         Assessments & Plan (with Medical Decision Making)   Patient presenting with . Nursing notes reviewed noting no acute issues.     I have reviewed the assessment completed by the [facility].     During the observation period, the patient {did/did not:[id number]::\"did not\"} require medications for agitation, and {did/did not:76137546::\"did not\"} require restraints/seclusion for patient and/or provider safety.    The patient was found to have a psychiatric condition that would benefit from an observation stay in the emergency department for further psychiatric stabilization and/or coordination of a safe disposition. The observation plan includes serial assessments of psychiatric condition, potential administration of medications if indicated, further disposition pending the patient's psychiatric course during the monitoring period.     Preliminary diagnosis:    ICD-10-CM    1. Acute psychosis (H)  F23            Treatment Plan:  - Olanzapine 10 mg now to treat auditory hallucinations.  - Pt encouraged to remain overnight in [facility] given current psychotic state. He declined and denied any active safety concerns.  - Prescription sent to the pharmacy of his choice for olanzapine 10 mg at bedtime, and 5 mg twice daily as needed for " hallucinations.  -Medication education attempted this visit includes, rationale for medication, importance of compliance, medication interactions, and common side effects.   -Supportive psychotherapy attempted regarding patient's stressors and past mental health symptoms problem solving ways to improve overall wellness and cope with acute and chronic mental health symptoms.  -Encouraged to refrain from THC use, which he agreed to.  - Discharge home with written information for community supports.      After the period in observation care, the patient's circumstances and mental state were safe for outpatient management. After counseling on the diagnosis, work-up, and treatment plan, the patient was discharged. Close follow-up with a psychiatrist and/or therapist was recommended and community psychiatric resources were provided. Patient is to return to the ED if any urgent or potentially life-threatening concerns.      At the time of discharge, the patient's acute suicide risk was determined to be low due to the following factors: Reduction in the intensity of mood/anxiety symptoms that preceded the admission, denial of suicidal thoughts, denies feeling helpless or hopeless, not currently under the influence of alcohol or illicit substances, denies experiencing command hallucinations, no immediate access to firearms. The patient's acute risk could be higher if noncompliant with their treatment plan, medications, follow-up appointments or using illicit substances or alcohol. Protective factors include: social supports, children, & stable housing.    --  ASAD Burnett CNP   [facility] EMERGENCY DEPT  Em[facility] Unit       plan includes serial assessments of psychiatric condition, potential administration of medications if indicated, further disposition pending the patient's psychiatric course during the monitoring period.     Preliminary diagnosis:    ICD-10-CM    1. Acute psychosis (H)  F23            Treatment Plan:  - Olanzapine 10 mg now to treat auditory hallucinations.  - Pt encouraged to remain overnight in [facility] given current psychotic state. He declined and denied any active safety concerns.  - Prescription sent to the pharmacy of his choice for olanzapine 10 mg at bedtime, and 5 mg twice daily as needed for hallucinations.  -Medication education attempted this visit includes, rationale for medication, importance of compliance, medication interactions, and common side effects.   -Supportive psychotherapy attempted regarding patient's stressors and past mental health symptoms problem solving ways to improve overall wellness and cope with acute and chronic mental health symptoms.  -Encouraged to refrain from THC use, which he agreed to.  - Substance use intake offered and declined.  - Discharge home with written information for community supports.      After the period in observation care, the patient's circumstances and mental state were safe for outpatient management. After counseling on the diagnosis, work-up, and treatment plan, the patient was discharged. Close follow-up with a psychiatrist and/or therapist was recommended and community psychiatric resources were provided. Patient is to return to the ED if any urgent or potentially life-threatening concerns.      At the time of discharge, the patient's acute suicide risk was determined to be low due to the following factors: Reduction in the intensity of mood/anxiety symptoms that preceded the admission, denial of suicidal thoughts, denies feeling helpless or hopeless, not currently under the influence of alcohol or illicit substances, denies experiencing command  hallucinations, no immediate access to firearms. The patient's acute risk could be higher if noncompliant with their treatment plan, medications, follow-up appointments or using illicit substances or alcohol. Protective factors include: social supports, children, & stable housing.    --  ASAD Burnett CNP   [facility] EMERGENCY DEPT  Em[facility] Unit         Bessy Benítez APRN CNP  05/08/25 194

## 2025-03-24 NOTE — CONSULTS
"Diagnostic Evaluation Consultation  Crisis Assessment    Patient Name: Kathie Mg  Age:  30 year old  Legal Sex: male  Gender Identity: male  Pronouns:   Race: Black or   Ethnicity: Not  or   Language: English      Patient was assessed: In person   Crisis Assessment Start Date: 03/24/25  Crisis Assessment Start Time: 0909  Crisis Assessment Stop Time: 0913  Patient location: [facility] Emergency Dept                             ED30    Referral Data and Chief Complaint  Kathie Mg presents to the ED via EMS. Patient is presenting to the ED for the following concerns: Other (see comment) (PTSD, Tactile Hallucinations). Factors that make the mental health crisis life threatening or complex are: Pt arrived to the ED via EMS due to concerns for PTSD and tactile hallucinations. Writer attempted to assess Pt 3x, but Pt was very minimally responsive. Assessment information is taken from chart review and minimal Pt responses. Pt reported a PTSD flare-up is what brought him in. Pt was unable to identify specific symptoms or triggers. Chart review indicates Pt arrived with tactile hallucinations. Per Dr. Ruiz \" He reports that he feels like there are skin all around that are going to eat him\". Pt appeared to be agitated upon admission as he was pacing in his room and needed to be re-directed by staff. Pt denied any SI/HI/AH/VH to writer..      Informed Consent and Assessment Methods  Explained the crisis assessment process, including applicable information disclosures and limits to confidentiality, assessed understanding of the process, and obtained consent to proceed with the assessment.  Assessment methods included conducting a formal interview with patient, review of medical records, collaboration with medical staff, and obtaining relevant collateral information from family and community providers when available.  : done     History of the Crisis   Pt arrived " to the ED via EMS due to a PTSD flare-up and tactile hallucinations. Pt has a very limited chart history with no indications of a prior mental health diagnosis. Per chart review Pt endorsed anxiety, depression, and PTSD as diagnoses. Pt was unable to be assessed for history of suicidal ideation, suicide attempts, OP supports.    Brief Psychosocial History  Family:   (Unable to assess.), Children  (Unable to assess.)  Support System:  Grandparent(s)  Employment Status:  other (see comments) (Unable to assess.)  Source of Income:  other (see comments) (Unable to assess.)  Financial Environmental Concerns:  other (see comments) (Unable to assess.)  Current Hobbies:   (Unable to assess.)  Barriers in Personal Life:   (Unable to assess.)    Significant Clinical History  Current Anxiety Symptoms:  anxious (Chart review indicates pacing and restlessness)  Current Depression/Trauma:   (Unable to assess)  Current Somatic Symptoms:  anxious (Chart review indicates pacing and restlessness)  Current Psychosis/Thought Disturbance:  tactile hallucinations  Current Eating Symptoms:   (Unable to assess)  Chemical Use History:  Alcohol:  (Unable to assess.)  Benzodiazepines:  (Unable to assess.)  Opiates:  (Unable to assess.)  Cocaine:  (Unable to assess.)  Marijuana: Daily  Other Use:  (Unable to assess.)  Withdrawal Symptoms:  (Unable to assess.)  Addictions:  (Unable to assess.)   Past diagnosis:  Anxiety Disorder, Depression, PTSD  Family history:  No known history of mental health or chemical health concerns  Past treatment:  No known formal treatment attempts  Details of most recent treatment:  Unable to assess Pt for past treatment attempts.  Other relevant history:       Have there been any medication changes in the past two weeks:  no (Unable to assess.)       Is the patient compliant with medications:  yes (Unable to assess.)        Collateral Information  Is there collateral information: No (Called Grandmother  (Francisca,[phone]) Left VM)     Collateral information name, relationship, phone number:       What happened today:       What is different about patient's functioning:       What do you think the patient needs:      Has patient made comments about wanting to kill themselves/others:      If d/c is recommended, can they take part in safety/aftercare planning:       Additional collateral information:        Risk Assessment  [place] Suicide Severity Rating Scale Full Clinical Version:  Suicidal Ideation  Q1 Wish to be Dead (Lifetime):  (Unable to assess lifetime SI/suicide attempts.)  Q2 Non-Specific Active Suicidal Thoughts (Lifetime):  (Unable to assess lifetime SI/suicide attempts.)  Q6 Suicide Behavior (Lifetime):  (Unable to assess lifetime SI/suicide attempts.)  Intensity of Ideation (Lifetime)  Most Severe Ideation Rating (Lifetime):  (Unable to assess lifetime SI/suicide attempts.)  Frequency (Lifetime):  (Unable to assess lifetime SI/suicide attempts.)  Duration (Lifetime):  (Unable to assess lifetime SI/suicide attempts.)  Controllability (Lifetime):  (Unable to assess lifetime SI/suicide attempts.)  Deterrents (Lifetime):  (Unable to assess lifetime SI/suicide attempts.)  Reasons for Ideation (Lifetime):  (Unable to assess lifetime SI/suicide attempts.)  Suicidal Behavior (Lifetime)  Actual Attempt (Lifetime):  (Unable to assess lifetime SI/suicide attempts.)  Has subject engaged in non-suicidal self-injurious behavior? (Lifetime):  (Unable to assess lifetime SI/suicide attempts.)  Interrupted Attempts (Lifetime):  (Unable to assess lifetime SI/suicide attempts.)  Aborted or Self-Interrupted Attempt (Lifetime):  (Unable to assess lifetime SI/suicide attempts.)  Preparatory Acts or Behavior (Lifetime):  (Unable to assess lifetime SI/suicide attempts.)    [place] Suicide Severity Rating Scale Recent:   Suicidal Ideation (Recent)  Q1 Wished to be Dead (Past Month):  (Pt denied current SI to writer, but did  not engage in further questioning.)  Q2 Suicidal Thoughts (Past Month):  (Pt denied current SI to writer, but did not engage in further questioning.)  Level of Risk per Screen: no risks indicated  Intensity of Ideation (Recent)  Most Severe Ideation Rating (Past 1 Month):  (Pt denied current SI to writer, but did not engage in further questioning.)  Frequency (Past 1 Month):  (Pt denied current SI to writer, but did not engage in further questioning.)  Duration (Past 1 Month):  (Pt denied current SI to writer, but did not engage in further questioning.)  Controllability (Past 1 Month):  (Pt denied current SI to writer, but did not engage in further questioning.)  Deterrents (Past 1 Month):  (Pt denied current SI to writer, but did not engage in further questioning.)  Reasons for Ideation (Past 1 Month):  (Pt denied current SI to writer, but did not engage in further questioning.)  Suicidal Behavior (Recent)  Actual Attempt (Past 3 Months):  (Pt denied current SI to writer, but did not engage in further questioning.)  Has subject engaged in non-suicidal self-injurious behavior? (Past 3 Months):  (Pt denied current SI to writer, but did not engage in further questioning.)  Interrupted Attempts (Past 3 Months):  (Pt denied current SI to writer, but did not engage in further questioning.)  Total Number of Interrupted Attempts (Past 3 Months):  (Pt denied current SI to writer, but did not engage in further questioning.)  Aborted or Self-Interrupted Attempt (Past 3 Months):  (Pt denied current SI to writer, but did not engage in further questioning.)  Preparatory Acts or Behavior (Past 3 Months):  (Pt denied current SI to writer, but did not engage in further questioning.)    Environmental or Psychosocial Events: other (see comment) (Unable to assess)  Protective Factors: Protective Factors: help seeking    Does the patient have thoughts of harming others? Feels Like Hurting Others: no  Previous Attempt to Hurt Others:  no  Is the patient engaging in sexually inappropriate behavior?:  (Unable to assess.)    Is the patient engaging in sexually inappropriate behavior?   (Unable to assess.)        Mental Status Exam   Affect: Constricted  Appearance: Appropriate  Attention Span/Concentration: Inattentive  Eye Contact: Avoidant    Fund of Knowledge: Appropriate   Language /Speech Content: Fluent  Language /Speech Volume: Soft  Language /Speech Rate/Productions: Minimally Responsive  Recent Memory: Intact  Remote Memory: Intact  Mood: Normal  Orientation to Person: Yes   Orientation to Place: Yes  Orientation to Time of Day: Yes  Orientation to Date: Yes     Situation (Do they understand why they are here?): Yes  Psychomotor Behavior: Underactive  Thought Content: Clear  Thought Form: Intact     Mini-Cog Assessment  Number of Words Recalled:    Clock-Drawing Test:     Three Item Recall:    Mini-Cog Total Score:       Medication  Psychotropic medications:   Medication Orders - Psychiatric (From admission, onward)      Start     Dose/Rate Route Frequency Ordered Stop    03/24/25 0800  OLANZapine zydis (zyPREXA) ODT tab 10 mg         10 mg Oral 2 TIMES DAILY PRN 03/24/25 0138               Current Care Team  Patient Care Team:  No Ref-Primary, Physician as PCP - General    Diagnosis  Patient Active Problem List   Diagnosis Code    ADHD (attention deficit hyperactivity disorder) F90.9    Insomnia G47.00       Primary Problem This Admission  There are no active hospital problems to display for this patient.      Clinical Summary and Substantiation of Recommendations   Clinical Substantiation:  It is the recommendation of this writer that the Pt remain on observation for continued symptom stabilization. Writer attempted to assess Pt 3x and Pt was minimally responsive. Pt would fall asleep mid-way through interaction with writer and not respond to questions. Pt turned away from writer and put blanket over head during multiple interactions.  Based on the Pt's current inability to be reassessed, inability to safety plan, and overall presentation it is the recommendation that Pt remain on observation until a reassessment can be done and a safety plan can be made.    Goals for crisis stabilization:  Engage with therapist for reassessment and safety plan.    Next steps for Care Team:  Reassess Pt for safety, safety plan, and provide OP supports if Pt requests.    Treatment Objectives Addressed:  rapport building, orienting the patient to therapy    Therapeutic Interventions:  Engaged in guided discovery, explored patient's perspectives and helped expand them through socratic dialogue.    Has a specific means been identified for suicidal/homicide actions: No    If yes, describe:       Explain action steps toward mitigation:       Document completion of mitigation actions:       The follow up action still needed prior to discharge:       Patient coping skills attempted to reduce the crisis:  Sought help at the ED.    Disposition  Recommended referrals: Medication Management        Reviewed case and recommendations with attending provider. Attending Name: Dr. Sheikh       Attending concurs with disposition: yes       Patient and/or validated legal guardian concurs with disposition:   yes       Final disposition:  observation      Legal status: Voluntary/Patient has signed consent for treatment                                                                                                                                 Reviewed court records: yes       Assessment Details   Total duration spent with the patient: 10 min     CPT code(s) utilized: Non-Billable    Shelley Brandon  MSW Intern  Callback: [phone]

## 2025-03-24 NOTE — ED NOTES
Patient has been discharged back to his home with a safety plan in place. Discharge instructions were reviewed with the patient, including follow-up care plan. Patient was instructed to  his Zyprexa medication at his outpatient [facility]'s. Patient was provided with GoodRx card and [facility] resources.Reviewed safety plan and outpatient resources. Patient denies suicidal ideation and homicidal ideation. All belongings that were brought into the hospital have been returned to the patient. Escorted off the unit at 1815 accompanied by [facility] staff. Transportation was provided by his partner.

## 2025-03-24 NOTE — ED PROVIDER NOTES
Patient signed out to me pending DEC assessment and possible empath.  I talked with DEC, they feel the patient is appropriate for empath and will be transferred there.  Patient is willing to go.     Lexi Herndon MD  03/24/25 [id number]

## 2025-03-24 NOTE — ED NOTES
Pt is more alert and asking about a cab ride. Informed pt that DEC needs to be able to assess him. DEC informed. VSS.

## 2025-03-24 NOTE — PROGRESS NOTES
"Triage and Transition Services Extended Care Reassessment     Patient: Kathie goes by \"Kathie,\" uses he/him pronouns  Date of Service: March 24, 2025  Site of Service: [facility] Emergency Dept                             [id number]  Patient was seen yes  Mode of Assessment: In person     Reason for Reassessment:      History of Patient's Original Emergency Room Encounter: Pt arrived to the ED via EMS due to a PTSD flare-up and tactile hallucinations. Pt has a very limited chart history with no indications of a prior mental health diagnosis. Per chart review Pt endorsed anxiety, depression, and PTSD as diagnoses. Pt was unable to be assessed for history of suicidal ideation, suicide attempts, OP supports.    Presentation Summary: Previous LMHP attempted 3 times to do a dec with this pt today and he was too sleepy to participate in an assessment.  He later became alert enough for this writer to provide a reassessment of the pt's presentation.  Kathie Mg is a 30 year old, male identifying individual who presented to the hospital this morning via EMS.  He was found at the [facility].  He reported having \"PTSD symptoms\" and \"feeling woosy, like I was going to pass out.\"  [place] the mall because \"I wanted to talk to my brothers.\"  He reported the mall security brought him to the hospital and that it was his choice to come over.  Pt reports having voices in his head that say \"we want to eat you.\"  He described feeling angry, \"like I want to rip stuff off of the wall.\"  When writer asked why he wasn't acting on his anger urges, he stated, \"cause I don't want to be like that.\"  Pt endorsed hearing voices say his name, even while writer was in the room.  He appeared to be responding to internal stimuli.  He also reports [facility] stating he sees shadows and he converses with the shadows.  He states he has passive SI and HI, though no plan or intent.  He said \"I just don't want to be here sometimes.\"  He " "also endorsed feeling strong anxiety as \"I could close my eyes and spontaneously combust.\" He lives with his ex-fiance and 3 children (ages 7, 2 and 1).  He also reports using dabs, and smoking weed, \"every once and a while.\"  The last instance being 3/25.  He admitted that his use of dabs may be contributing to his psychosis symptoms.    Therapeutic Interventions Provided: Coached on coping techniques/relaxation skills to help improve distress tolerance and managing intense emotions., Reviewed healthy living that supports positive mental health, including looking at sleep hygiene, regular movement, nutrition, and regular socialization., Explored strategies for self-soothing.    Current Symptoms: anxious difficulty concentrating, irritable, helplessness, thoughts of death/suicide somatic symptoms (abdominal pain, headache, tension), racing thoughts, wandering, anxious anger, agitation, distractability, auditory hallucinations, visual hallucinations  (not assessed)    Mental Status Exam   Affect: Constricted  Appearance: Appropriate  Attention Span/Concentration: Inattentive  Eye Contact: Avoidant    Fund of Knowledge: Appropriate   Language /Speech Content: Fluent  Language /Speech Volume: Soft  Language /Speech Rate/Productions: Minimally Responsive  Recent Memory: Intact  Remote Memory: Intact  Mood: Normal  Orientation to Person: Yes   Orientation to Place: Yes  Orientation to Time of Day: Yes  Orientation to Date: Yes     Situation (Do they understand why they are here?): Yes  Psychomotor Behavior: Underactive  Thought Content: Clear  Thought Form: Intact    Treatment Objective(s) Addressed: rapport building, orienting the patient to therapy, assessing safety    Patient Response to Interventions: acceptance expressed    Progress Towards Goals:  Patient Reports Symptoms Are: worsening  Patient Progress Toward Goals: is not making progress  Next Step to Work Toward Discharge: symptom stabilization, collaboration " with OP team/family/friends  Symptom Stabilization Comment: pt reports significant distress from the hallucinations and would like to try medication to decrease the symptoms.  Collaboration Comment: Pt could benefit from collaboration with op providers in the community.    Case Management:      C-SSRS Since Last Contact:   1. Wish to be Dead (Since Last Contact): Yes  Wish to be Dead Description (Since Last Contact): wished to go to sleep and not wake up.  2. Non-Specific Active Suicidal Thoughts (Since Last Contact): No  Most Severe Ideation Rating (Since Last Contact): 1  Frequency (Since Last Contact): Daily or almost daily  Duration (Since Last Contact): Fleeting, few seconds or minutes  Controllability (Since Last Contact): Can control thoughts with some difficulty  Deterrents (Since Last Contact): Deterrents definitely stopped you from attempting suicide  Reasons for Ideation (Since Last Contact): Mostly to end or stop the pain (You couldn't go on living with the pain or how you were feeling)  Actual Attempt (Since Last Contact): No  Has subject engaged in non-suicidal self-injurious behavior? (Since Last Contact): No  Interrupted Attempts (Since Last Contact): No  Aborted or Self-Interrupted Attempt (Since Last Contact): No  Preparatory Acts or Behavior (Since Last Contact): No  Suicide (Since Last Contact): No     Calculated C-SSRS Risk Score (Since Last Contact): Low Risk    Plan: Final Disposition / Recommended Care Path: discharge  Plan for Care reviewed with assigned Medical Provider: yes  Plan for Care Team Review: RN, provider  Comments: Positive for Cannabis Use  Patient and/or validated legal guardian concurs: yes  Clinical Substantiation: This writer recommends pt stay on observation on the [facility] unit to start medication and symptom stabilization.  Pt endorses AH and VH and thoughts of harming himself and others.  He does not endorse plan or intent or means.  Pt ultimately decided to d/c due to  family responsibilities.  Pt was reassessed for safety by this writer and the provider.  He reported no plan, intent or means for suicide and no plan, intent or means to harm his family.  He agreed to safety plan and is not in imminent risk of harming himself or others.  Safety plan was prepared and reviewed, this writer provided community resources, mental health crisis resources and the contact to return to the hospital if he is in acute mental health crisis.    Legal Status: Legal Status: Voluntary/Patient has signed consent for treatment    Session Status: Time session started: 1645  Time session ended: 1705  Session Duration (minutes): 20 minutes  Session Number: 1  Anticipated number of sessions or this episode of care: 1    Session Start Time: 1645  Session Stop Time: 1705  CPT codes: 36010 - Psychotherapy (with patient) - 30 (16-37*) min  Time Spent: 20 minutes      CPT code(s) utilized: 89938 - Psychotherapy (with patient) - 30 (16-37*) min    Diagnosis:   Patient Active Problem List   Diagnosis Code    ADHD (attention deficit hyperactivity disorder) F90.9    Insomnia G47.00    Hallucinations R44.3    Acute psychosis (H) F23       Primary Problem This Admission: Active Hospital Problems    Hallucinations      Acute psychosis (H)        Stacey Nguyen [facility]   Licensed Mental Health Professional (LMHP), Extended Care  [phone]

## 2025-03-24 NOTE — ED NOTES
Pt walking into nursing station and refuses to get back to his room. Security called to re-direct patient to his room.

## 2025-03-24 NOTE — ED NOTES
Kathie Mg  March 24, 2025  Plan of Care Hand-off Note     Patient Recommended Care Path: observation    Clinical Substantiation:  It is the recommendation of this writer that the Pt remain on observation for continued symptom stabilization. Writer attempted to assess Pt 3x and Pt was minimally responsive. Pt would fall asleep mid-way through interaction with writer and not respond to questions. Pt turned away from writer and put blanket over head during multiple interactions. Based on the Pt's current inability to be reassessed, inability to safety plan, and overall presentation it is the recommendation that Pt remain on observation until a reassessment can be done and a safety plan can be made.    Goals for crisis stabilization:  Engage with therapist for reassessment and safety plan.    Next steps for Care Team:  Reassess Pt for safety, safety plan, and provide OP supports if Pt requests.    Treatment Objectives Addressed:  rapport building, orienting the patient to therapy    Therapeutic Interventions:  Engaged in guided discovery, explored patient's perspectives and helped expand them through socratic dialogue.    Has a specific means been identified for suicidal.homicide actions: No  If yes, describe:    Explain action steps toward mitigation:    Document completion of mitigation action:    The follow up action still needed prior to discharge:      Patient coping skills attempted to reduce the crisis:  Sought help at the ED.        Collateral contact information:       Legal Status: Voluntary/Patient has signed consent for treatment                                                                                                                                 Reviewed court records: yes     Psychiatry Consult:     Shelley Brandon         Statement Selected

## 2025-03-24 NOTE — ED NOTES
Patient restless, pacing in the room and often opens the door to nursing station. Soft, not clear speech.

## 2025-03-24 NOTE — ED NOTES
DEC assessment attempted, pt remains too sedated to participate. Sats 100% on room air with RR 12.

## 2025-03-24 NOTE — DISCHARGE INSTRUCTIONS
Mental health recommendations    Please follow-up with your outpatient team about your visit today.    2.  One of our care coordinators will reach out to you after your discharge.  If you have any questions about additional resources please call our coordinators at # [phone]. If you would like to schedule an appointment for therapy or psychiatry  please call our Behavioral Access Scheduling office at [phone].      3.  Please use aftercare plan and already established coping skills and safety planning as needed.     4.  Please call 911 and/or return to the emergency department if her symptoms worsen or your safety becomes compromised. [facility] Adult crisis line, [phone]    Sliding Scale Resources:     [facility] ([facility])  Website: [email or web address]://[facility]/  Services: Medical care, dental care, mental health care, legal services, and care coordination services.   Location(s): [address] [place] [address]  Phone: [phone]    [facility]  Website: https://www.[facility].org/cost/sliding-fee-discount/  Services: Medical Care and Therapy  Location(s): Several clinics around the [facility]  Phone: [phone]    [facility]  Website: https://walkin.org/  Services: Free counseling (M,W,F: 1:00p-3:00p for in-person/virtual, M,T,W,Th: 5:30p-7:30p for virtual)  Location(s): [address] [place] [address]    Phone: [phone]    [facility]  Website: https://www.North./residents/health-medical/mental-health-center  Services: Assessments, therapy, medication services, care coordination/resource assistance  Location(s): [address] [place] [address]  Phone: [phone]    [facility]  Website: https://[facility]-[place].com/  Services: Outpatient mental health services for people covered by [place] Health Care Programs or private insurance. People without  insurance may be eligible to receive services on a sliding fee schedule.  Location(s): [place], [place], [place], \[facility]", [facility], [facility]  Phone: [phone]     [facility]  Website: https://[facility]/  Services: Outpatient mental health services for people covered by [place] [facility] [place] Programs or private insurance. People without insurance may be eligible to receive services on a sliding fee schedule.  Location(s): [place], [facility], [facility], [facility]  Phone: [phone]    [employer] [place] [facility] [place] ([facility] [place])  Website: https://namimn.org/  Services: Classes and support groups are offered free of charge. Please check the website for up-to-date information on parent resource groups, classes and workshops.    Location(s): [address] [facility], [address] [place] [address]  Phone: [phone] // 5-615-LSNN-HELPS     [facility]:  Website: https://www.vailplace.org/  Services: Will help get connected with mental health and physical health services, set up and access insurance, access safe and stable housing, and offers case management services to help you stay on track to reach personal goals.  Location(s): [address], [place] [address]  Phone: [phone]       [facility] [employer] [facility] ([facility])   Website: https://www.[facility]/[employer]/  Services: Drop in center that is staffed with nursing students, nursing staff, student PA s and faculty.  Not a clinic, but focuses on basic healthcare and personal hygiene needs. Closed when schools are closed for the weather or on school breaks, so call ahead. Monday and Thursday 10-11:30 am.    Location(s): [address] [address]   Phone: [phone]      [facility]   Website:  https://www.[facility].org/clinic/[facility]/  Services: Monday-Friday 8 am-5 pm. Call for appointment. Healthcare for all ages    Location(s): [address] [facility] [address] [facility] [address]   Phone: [phone]       [facility]   Website: [email or web address].[facility].org    Services: By appointment only. (Some same-day appointments available.) Monday-Thursday 9 am-7 pm; Friday 9 am-4pm; Saturday 10 am-2 pm. Sliding fee scale.  Sexual health, women's health, family planning.   Location(s): [address], [place] [address]  Phone: [phone]     [facility]   Website: [email or web address].[email or web address]/  Services: Monday-Friday 8 am-5 pm. Sliding fee scale. Cancer screens, CD and mental health services.  Basic medical for adults and peds.  Family planning, testing hypertension screenings.   Location(s): [address] [facility] [address] [facility] [address] [facility] [address] [facility] [address] [place] [address]   Phone: [phone]      [facility]   Website: https://www.[facility].[email or web address]/  Services: Monday-Friday 8 am-5 pm. Provides: provides a large range of medical services   Location(s): [address] [facility] [address]   Phone: [phone]    [facility]   Website: https://www.[facility].[email or web address]/  Services: Medical Care, Dental Care, Behavioral Health, and Supportive Services  Location(s): [facility] [address] [place] [address] [address] [place] [address]  Phone: [phone]      [facility]   Website: https://[facility].org/pnc  Services: Our goal in making PNC services available to our patients is to provide everyone the opportunity to receive free, quality health care regardless of their insurance situation, citizenship status, or financial background.  Medical Services, Dental, Mental Health, Nutrition, Lab Services, Legal Services, and Pharmaceutical Care.   Location(s): [address] [place] [address] ([facility])  Phone: [phone]      [facility]   Website: https://www.[facility].org/  Services: Medical, Dental, Behavioral Health, Vision, and MNSure enrollment assistance.   Location(s):    Medical & Behavioral Health: [address] [place] [address]   Dental & Vision: [address] [place] [address]  Phone:   Medical: [phone]  Dental: [phone]  Vision: [phone]  Behavioral Health: [phone]     [facility]   Website: https://www.[facility].[email or web address]/  Services: Services provided by [facility] include the patient s visit to the clinic, lab tests, xrays, diagnostic tests, and most medications. Specialty referrals are provided to the patient without charge through coordinated efforts with [facility] providers.   Location(s): [address] [place] [address]  Phone: [phone]     [facility]   Website: https://www.[facility].org/clinic/[facility]/  Services: See website for full list  - Family Medicine (primary care for all genders and all ages)  - Women s Care - Obstetrics / Gynecology  - Lactation Consultants  - Pediatrics (care for infants, children, adolescents)  - Integrative Health  - Chiropractic  - Acupuncture  - Integrative Primary Care  - Financial Counseling  - Diagnostic Imaging  - Lab Services  Location(s): [address] [place], [address]  Phone: [phone]      [facility]  Website: [email or web address]://[email or web address]  Discounted mediation at certain pharmacies    Online discount medications:  [email or web address]://www.[email or web address] Pharmaceuticalsdrugs.[email or web address]/

## 2025-03-25 ENCOUNTER — TELEPHONE (OUTPATIENT)
Dept: BEHAVIORAL HEALTH | Facility: CLINIC | Age: 30
End: 2025-03-25

## 2025-03-26 NOTE — TELEPHONE ENCOUNTER
Called Patient to speak with them about the possible need for further appointments and resources.     Phone not in service